# Patient Record
Sex: MALE | Race: WHITE | NOT HISPANIC OR LATINO | Employment: UNEMPLOYED | URBAN - METROPOLITAN AREA
[De-identification: names, ages, dates, MRNs, and addresses within clinical notes are randomized per-mention and may not be internally consistent; named-entity substitution may affect disease eponyms.]

---
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
## 2022-08-02 ENCOUNTER — OFFICE VISIT (OUTPATIENT)
Dept: FAMILY MEDICINE CLINIC | Facility: CLINIC | Age: 22
End: 2022-08-02
Payer: COMMERCIAL

## 2022-08-02 VITALS
WEIGHT: 173 LBS | RESPIRATION RATE: 12 BRPM | HEART RATE: 76 BPM | HEIGHT: 69 IN | BODY MASS INDEX: 25.62 KG/M2 | SYSTOLIC BLOOD PRESSURE: 124 MMHG | DIASTOLIC BLOOD PRESSURE: 72 MMHG | OXYGEN SATURATION: 97 % | TEMPERATURE: 97.6 F

## 2022-08-02 DIAGNOSIS — Z00.00 ENCOUNTER FOR ANNUAL GENERAL MEDICAL EXAMINATION WITHOUT ABNORMAL FINDINGS IN ADULT: Primary | ICD-10-CM

## 2022-08-02 PROCEDURE — 3725F SCREEN DEPRESSION PERFORMED: CPT | Performed by: NURSE PRACTITIONER

## 2022-08-02 PROCEDURE — 99395 PREV VISIT EST AGE 18-39: CPT | Performed by: NURSE PRACTITIONER

## 2022-08-02 NOTE — PROGRESS NOTES
FAMILY PRACTICE HEALTH MAINTENANCE OFFICE VISIT  [facility] - [address] PHYSICIANS    NAME: Malu Dolan  AGE: 25 y o  SEX: male  : 2000     DATE: 2022    Assessment and Plan     1  Encounter for annual general medical examination without abnormal findings in adult  Comments:  Age appropriate screenings and recommendations discussed  · Patient Counseling:   · Nutrition: Stressed importance of a well balanced diet, moderation of sodium/saturated fat, caloric balance and sufficient intake of fiber  · Exercise: Stressed the importance of regular exercise with a goal of 150 minutes per week  · Dental Health: Discussed daily flossing and brushing and regular dental visits   · Sexuality: Discussed sexually transmitted infections, use of condoms and prevention of unintended pregnancy  · Alcohol Use:  Recommended moderation of alcohol intake  · Injury Prevention: Discussed Safety Belts, Safety Helmets, and Smoke Detectors    · Immunizations reviewed: Up To Date  · Discussed benefits of:  Screening labs   BMI Counseling: Body mass index is 25 92 kg/m²  Discussed with patient's BMI with him  The BMI is above normal  Exercise recommendations include exercising 3-5 times per week      Return in about 1 year (around 2023) for Annual physical         Chief Complaint     Chief Complaint   Patient presents with    Annual Exam       History of Present Illness     HPI    Well Adult Physical   Patient here for a comprehensive physical exam       Diet and Physical Activity  Diet: well balanced diet  Exercise: daily      Depression Screen  PHQ-2/9 Depression Screening    Little interest or pleasure in doing things: 0 - not at all  Feeling down, depressed, or hopeless: 0 - not at all  PHQ-2 Score: 0  PHQ-2 Interpretation: Negative depression screen          General Health  Hearing: Normal:  bilateral  Vision: no vision problems  Dental: regular dental visits    Reproductive Health  No issues       The following portions of the patient's history were reviewed and updated as appropriate: allergies, current medications, past family history, past medical history, past social history, past surgical history and problem list     Review of Systems     Review of Systems   Constitutional: Negative for diaphoresis, fatigue and fever  HENT: Negative for ear pain and hearing loss  Eyes: Negative for pain and visual disturbance  Respiratory: Negative for chest tightness and shortness of breath  Cardiovascular: Negative for chest pain, palpitations and leg swelling  Gastrointestinal: Negative for abdominal pain, constipation and diarrhea  Genitourinary: Negative for difficulty urinating  Musculoskeletal: Negative for arthralgias and myalgias  Skin: Negative for rash  Neurological: Negative for dizziness, numbness and headaches  Psychiatric/Behavioral: Negative for sleep disturbance         Past Medical History     Past Medical History:   Diagnosis Date    Allergic rhinitis     seasonal    Fracture of femur, closed ([facility] [address] )        Past Surgical History     Past Surgical History:   Procedure Laterality Date    RHINOPLASTY         Social History     Social History     Socioeconomic History    Marital status: Single     Spouse name: None    Number of children: None    Years of education: None    Highest education level: None   Occupational History    None   Tobacco Use    Smoking status: Never Smoker    Smokeless tobacco: Never Used   Vaping Use    Vaping Use: Never used   Substance and Sexual Activity    Alcohol use: No    Drug use: Yes     Comment: occa    Sexual activity: None   Other Topics Concern    None   Social History Narrative    Regular dental care    No caffeine use     Social Determinants of Health     Financial Resource Strain: Not on file   Food Insecurity: Not on file   Transportation Needs: Not on file   Physical Activity: Not on file   Stress: Not on file   Social Connections: Not on file   Intimate Partner Violence: Not on file   Housing Stability: Not on file       Family History     Family History   Problem Relation Age of Onset    No Known Problems Mother     Kidney cancer Maternal Grandfather     No Known Problems Father     Substance Abuse Neg Hx     Mental illness Neg Hx        Current Medications       Current Outpatient Medications:     terbinafine (LamISIL) 250 mg tablet, Take 1 tablet (250 mg total) by mouth daily, Disp: 45 tablet, Rfl: 1     Allergies     No Known Allergies    Objective     /72 (BP Location: Right arm, Patient Position: Sitting, Cuff Size: Adult)   Pulse 76   Temp 97 6 °F (36 4 °C) (Temporal)   Resp 12   Ht 5' 8 5" (1 74 m)   Wt 78 5 kg (173 lb)   SpO2 97%   BMI 25 92 kg/m²      Physical Exam  Vitals reviewed  Constitutional:       General: He is not in acute distress  Appearance: Normal appearance  He is well-developed  He is not diaphoretic  HENT:      Head: Normocephalic and atraumatic  Right Ear: Tympanic membrane, ear canal and external ear normal       Left Ear: Tympanic membrane, ear canal and external ear normal    Eyes:      General: Lids are normal       Extraocular Movements: Extraocular movements intact  Conjunctiva/sclera: Conjunctivae normal       Pupils: Pupils are equal, round, and reactive to light  Pupils are equal       Funduscopic exam:     Right eye: No hemorrhage or exudate  Red reflex present  Left eye: No hemorrhage or exudate  Red reflex present  Neck:      Thyroid: No thyroid mass or thyromegaly  Vascular: No carotid bruit  Trachea: No tracheal deviation  Cardiovascular:      Rate and Rhythm: Normal rate and regular rhythm  Pulses: Normal pulses  Heart sounds: Normal heart sounds, S1 normal and S2 normal  No murmur heard  Pulmonary:      Effort: Pulmonary effort is normal       Breath sounds: Normal breath sounds   No decreased breath sounds, wheezing, rhonchi or rales  Chest:   Breasts:      Right: No supraclavicular adenopathy  Left: No supraclavicular adenopathy  Abdominal:      General: Bowel sounds are normal  There is no distension  Palpations: Abdomen is soft  There is no hepatomegaly or splenomegaly  Tenderness: There is no abdominal tenderness  Musculoskeletal:         General: No tenderness or deformity  Normal range of motion  Cervical back: Full passive range of motion without pain, normal range of motion and neck supple  Right lower leg: No edema  Left lower leg: No edema  Lymphadenopathy:      Cervical: No cervical adenopathy  Upper Body:      Right upper body: No supraclavicular adenopathy  Left upper body: No supraclavicular adenopathy  Skin:     General: Skin is warm and dry  Findings: No rash  Neurological:      General: No focal deficit present  Mental Status: He is alert and oriented to person, place, and time  Cranial Nerves: No cranial nerve deficit  Motor: Motor function is intact  Coordination: Coordination normal       Deep Tendon Reflexes: Reflexes are normal and symmetric  Psychiatric:         Speech: Speech normal          Behavior: Behavior normal          Thought Content:  Thought content normal          Judgment: Judgment normal              Ariel Garcia, [address] [facility],[address]

## 2022-12-12 ENCOUNTER — OFFICE VISIT (OUTPATIENT)
Dept: FAMILY MEDICINE CLINIC | Facility: CLINIC | Age: 22
End: 2022-12-12

## 2022-12-12 VITALS
SYSTOLIC BLOOD PRESSURE: 112 MMHG | TEMPERATURE: 98.2 F | WEIGHT: 166 LBS | OXYGEN SATURATION: 98 % | HEIGHT: 69 IN | RESPIRATION RATE: 12 BRPM | HEART RATE: 72 BPM | DIASTOLIC BLOOD PRESSURE: 68 MMHG | BODY MASS INDEX: 24.59 KG/M2

## 2022-12-12 DIAGNOSIS — H92.22 BLEEDING FROM LEFT EAR: Primary | ICD-10-CM

## 2022-12-12 NOTE — PROGRESS NOTES
Assessment/Plan:    1  Bleeding from left ear  Comments:  Advise clerical to schedule with ENT  Pt is scheduled to see Dr Reddy Thorne today at 1pm              Return if symptoms worsen or fail to improve  Subjective:      Patient ID: Vanessa Foster is a 25 y o  male  Chief Complaint   Patient presents with   • Ear Injury     Pt c/o left sided ear bleeding after cleaning and muffled hear since last night  Azra Jasso is a 25year old male who presents to the office for evaluation and management ear bleeding and muffled hearing  Reports that he used a qtip yesterday and then a few hours later was wearing headphones and noticed blood on the device after removing his headphones  Pt reports that his ear has been bleeding since that time  Denies pain but does report that his hearing is muffled on the left side  Denies preceding injury or exposure to loud noisses  The following portions of the patient's history were reviewed and updated as appropriate: allergies, current medications, past family history, past medical history, past social history, past surgical history and problem list     Review of Systems   HENT: Positive for ear discharge and hearing loss  Negative for ear pain  No current outpatient medications on file  No current facility-administered medications for this visit  Objective:    /68 (BP Location: Right arm, Patient Position: Sitting, Cuff Size: Large)   Pulse 72   Temp 98 2 °F (36 8 °C) (Temporal)   Resp 12   Ht 5' 8 5" (1 74 m)   Wt 75 3 kg (166 lb)   SpO2 98%   BMI 24 87 kg/m²        Physical Exam  Vitals reviewed  Constitutional:       Appearance: Normal appearance  HENT:      Head: Normocephalic and atraumatic  Right Ear: Tympanic membrane, ear canal and external ear normal       Left Ear: Drainage present  No tenderness        Ears:      Comments: Profuse bleeding; unable to visualize TM  Neurological:      Mental Status: He is alert and oriented to person, place, and time     Psychiatric:         Mood and Affect: Mood normal                 OMID Vance

## 2024-07-01 DIAGNOSIS — Z11.4 SCREENING FOR HIV (HUMAN IMMUNODEFICIENCY VIRUS): ICD-10-CM

## 2024-07-01 DIAGNOSIS — Z13.6 SCREENING FOR HYPERTENSION: ICD-10-CM

## 2024-07-01 DIAGNOSIS — Z13.29 SCREENING FOR THYROID DISORDER: ICD-10-CM

## 2024-07-01 DIAGNOSIS — Z13.220 SCREENING FOR LIPID DISORDERS: ICD-10-CM

## 2024-07-01 DIAGNOSIS — Z11.59 NEED FOR HEPATITIS C SCREENING TEST: ICD-10-CM

## 2024-07-01 DIAGNOSIS — Z00.00 ENCOUNTER FOR ANNUAL GENERAL MEDICAL EXAMINATION WITHOUT ABNORMAL FINDINGS IN ADULT: Primary | ICD-10-CM

## 2024-08-21 LAB
ALBUMIN SERPL-MCNC: 4.9 G/DL (ref 3.6–5.1)
ALBUMIN/GLOB SERPL: 1.8 (CALC) (ref 1–2.5)
ALP SERPL-CCNC: 45 U/L (ref 36–130)
ALT SERPL-CCNC: 16 U/L (ref 9–46)
AST SERPL-CCNC: 22 U/L (ref 10–40)
BASOPHILS # BLD AUTO: 21 CELLS/UL (ref 0–200)
BASOPHILS NFR BLD AUTO: 0.5 %
BILIRUB SERPL-MCNC: 0.8 MG/DL (ref 0.2–1.2)
BUN SERPL-MCNC: 22 MG/DL (ref 7–25)
BUN/CREAT SERPL: NORMAL (CALC) (ref 6–22)
CALCIUM SERPL-MCNC: 9.8 MG/DL (ref 8.6–10.3)
CHLORIDE SERPL-SCNC: 101 MMOL/L (ref 98–110)
CHOLEST SERPL-MCNC: 212 MG/DL
CHOLEST/HDLC SERPL: 3 (CALC)
CO2 SERPL-SCNC: 29 MMOL/L (ref 20–32)
CREAT SERPL-MCNC: 1.03 MG/DL (ref 0.6–1.24)
EOSINOPHIL # BLD AUTO: 41 CELLS/UL (ref 15–500)
EOSINOPHIL NFR BLD AUTO: 1 %
ERYTHROCYTE [DISTWIDTH] IN BLOOD BY AUTOMATED COUNT: 12.6 % (ref 11–15)
GFR/BSA.PRED SERPLBLD CYS-BASED-ARV: 105 ML/MIN/1.73M2
GLOBULIN SER CALC-MCNC: 2.8 G/DL (CALC) (ref 1.9–3.7)
GLUCOSE SERPL-MCNC: 81 MG/DL (ref 65–99)
HCT VFR BLD AUTO: 45.2 % (ref 38.5–50)
HCV AB SERPL QL IA: NORMAL
HDLC SERPL-MCNC: 71 MG/DL
HGB BLD-MCNC: 15.9 G/DL (ref 13.2–17.1)
HIV 1+2 AB+HIV1 P24 AG SERPL QL IA: NORMAL
LDLC SERPL CALC-MCNC: 122 MG/DL (CALC)
LYMPHOCYTES # BLD AUTO: 2136 CELLS/UL (ref 850–3900)
LYMPHOCYTES NFR BLD AUTO: 52.1 %
MCH RBC QN AUTO: 30.1 PG (ref 27–33)
MCHC RBC AUTO-ENTMCNC: 35.2 G/DL (ref 32–36)
MCV RBC AUTO: 85.6 FL (ref 80–100)
MONOCYTES # BLD AUTO: 295 CELLS/UL (ref 200–950)
MONOCYTES NFR BLD AUTO: 7.2 %
NEUTROPHILS # BLD AUTO: 1607 CELLS/UL (ref 1500–7800)
NEUTROPHILS NFR BLD AUTO: 39.2 %
NONHDLC SERPL-MCNC: 141 MG/DL (CALC)
PLATELET # BLD AUTO: 147 THOUSAND/UL (ref 140–400)
PMV BLD REES-ECKER: 11.4 FL (ref 7.5–12.5)
POTASSIUM SERPL-SCNC: 4.3 MMOL/L (ref 3.5–5.3)
PROT SERPL-MCNC: 7.7 G/DL (ref 6.1–8.1)
RBC # BLD AUTO: 5.28 MILLION/UL (ref 4.2–5.8)
SODIUM SERPL-SCNC: 139 MMOL/L (ref 135–146)
TRIGL SERPL-MCNC: 87 MG/DL
TSH SERPL-ACNC: 2.09 MIU/L (ref 0.4–4.5)
WBC # BLD AUTO: 4.1 THOUSAND/UL (ref 3.8–10.8)

## 2024-09-18 ENCOUNTER — OFFICE VISIT (OUTPATIENT)
Dept: FAMILY MEDICINE CLINIC | Facility: CLINIC | Age: 24
End: 2024-09-18
Payer: COMMERCIAL

## 2024-09-18 VITALS
RESPIRATION RATE: 16 BRPM | OXYGEN SATURATION: 99 % | DIASTOLIC BLOOD PRESSURE: 82 MMHG | BODY MASS INDEX: 24.59 KG/M2 | HEART RATE: 70 BPM | HEIGHT: 69 IN | WEIGHT: 166 LBS | SYSTOLIC BLOOD PRESSURE: 122 MMHG | TEMPERATURE: 98 F

## 2024-09-18 DIAGNOSIS — Z13.29 SCREENING FOR HYPOTHYROIDISM: ICD-10-CM

## 2024-09-18 DIAGNOSIS — Z13.6 SCREENING FOR HYPERTENSION: ICD-10-CM

## 2024-09-18 DIAGNOSIS — Z00.00 ROUTINE GENERAL MEDICAL EXAMINATION AT A HEALTH CARE FACILITY: Primary | ICD-10-CM

## 2024-09-18 DIAGNOSIS — Z13.220 SCREENING FOR HYPERLIPIDEMIA: ICD-10-CM

## 2024-09-18 PROBLEM — B35.1 TOENAIL FUNGUS: Status: RESOLVED | Noted: 2020-04-14 | Resolved: 2024-09-18

## 2024-09-18 PROBLEM — B35.9 TINEA: Status: RESOLVED | Noted: 2020-03-08 | Resolved: 2024-09-18

## 2024-09-18 LAB
SL AMB  POCT GLUCOSE, UA: NORMAL
SL AMB LEUKOCYTE ESTERASE,UA: NEGATIVE
SL AMB POCT BILIRUBIN,UA: NEGATIVE
SL AMB POCT BLOOD,UA: NEGATIVE
SL AMB POCT CLARITY,UA: CLEAR
SL AMB POCT COLOR,UA: YELLOW
SL AMB POCT KETONES,UA: NEGATIVE
SL AMB POCT NITRITE,UA: NEGATIVE
SL AMB POCT PH,UA: 7
SL AMB POCT SPECIFIC GRAVITY,UA: 1
SL AMB POCT URINE PROTEIN: NEGATIVE
SL AMB POCT UROBILINOGEN: NORMAL

## 2024-09-18 PROCEDURE — 81003 URINALYSIS AUTO W/O SCOPE: CPT | Performed by: FAMILY MEDICINE

## 2024-09-18 PROCEDURE — 99395 PREV VISIT EST AGE 18-39: CPT | Performed by: FAMILY MEDICINE

## 2024-09-18 NOTE — LETTER
JESSICA GRIFFIN    No current outpatient medications on file.      Recent Results (from the past 2688 hour(s))   CBC and differential    Collection Time: 08/21/24  7:16 AM   Result Value Ref Range    White Blood Cell Count 4.1 3.8 - 10.8 Thousand/uL    Red Blood Cell Count 5.28 4.20 - 5.80 Million/uL    Hemoglobin 15.9 13.2 - 17.1 g/dL    HCT 45.2 38.5 - 50.0 %    MCV 85.6 80.0 - 100.0 fL    MCH 30.1 27.0 - 33.0 pg    MCHC 35.2 32.0 - 36.0 g/dL    RDW 12.6 11.0 - 15.0 %    Platelet Count 147 140 - 400 Thousand/uL    SL AMB MPV 11.4 7.5 - 12.5 fL    Neutrophils (Absolute) 1,607 1,500 - 7,800 cells/uL    Lymphocytes (Absolute) 2,136 850 - 3,900 cells/uL    Monocytes (Absolute) 295 200 - 950 cells/uL    Eosinophils (Absolute) 41 15 - 500 cells/uL    Basophils ABS 21 0 - 200 cells/uL    Neutrophils 39.2 %    Lymphocytes 52.1 %    Monocytes 7.2 %    Eosinophils 1.0 %    Basophils PCT 0.5 %   Comprehensive metabolic panel    Collection Time: 08/21/24  7:16 AM   Result Value Ref Range    Glucose, Random 81 65 - 99 mg/dL    BUN 22 7 - 25 mg/dL    Creatinine 1.03 0.60 - 1.24 mg/dL    eGFR 105 > OR = 60 mL/min/1.73m2    SL AMB BUN/CREATININE RATIO SEE NOTE: 6 - 22 (calc)    Sodium 139 135 - 146 mmol/L    Potassium 4.3 3.5 - 5.3 mmol/L    Chloride 101 98 - 110 mmol/L    CO2 29 20 - 32 mmol/L    Calcium 9.8 8.6 - 10.3 mg/dL    Protein, Total 7.7 6.1 - 8.1 g/dL    Albumin 4.9 3.6 - 5.1 g/dL    Globulin 2.8 1.9 - 3.7 g/dL (calc)    Albumin/Globulin Ratio 1.8 1.0 - 2.5 (calc)    TOTAL BILIRUBIN 0.8 0.2 - 1.2 mg/dL    Alkaline Phosphatase 45 36 - 130 U/L    AST 22 10 - 40 U/L    ALT 16 9 - 46 U/L   Hepatitis C Ab W/Refl To HCV RNA, Qn, PCR    Collection Time: 08/21/24  7:16 AM   Result Value Ref Range    HEP C AB NON-REACTIVE NON-REACTIVE   Lipid panel    Collection Time: 08/21/24  7:16 AM   Result Value Ref Range    Total Cholesterol 212 (H) <200 mg/dL    HDL 71 > OR = 40 mg/dL    Triglycerides 87 <150 mg/dL    LDL Calculated 122  (H) mg/dL (calc)    Chol HDLC Ratio 3.0 <5.0 (calc)    Non-HDL Cholesterol 141 (H) <130 mg/dL (calc)   TSH, 3rd generation    Collection Time: 08/21/24  7:16 AM   Result Value Ref Range    TSH 2.09 0.40 - 4.50 mIU/L   HIV 1/2 AG/AB W REFLEX [facility] and [facility] only    Collection Time: 08/21/24  7:16 AM   Result Value Ref Range    HIV AG/AB, 4th Gen NON-REACTIVE NON-REACTIVE

## 2024-09-18 NOTE — PROGRESS NOTES
FAMILY PRACTICE HEALTH MAINTENANCE OFFICE VISIT  [facility] - [facility]ANS    NAME: Trenton Dow  AGE: 24 y.o. SEX: male  : 2000     DATE: 2024    Assessment and Plan     Problem List Items Addressed This Visit    None  Visit Diagnoses       Routine general medical examination at a health care facility    -  Primary    Screening for hypertension        Screening for hyperlipidemia        Screening for hypothyroidism                     Return in about 1 year (around 2025) for Annual physical.        Chief Complaint     Chief Complaint   Patient presents with   • Physical Exam       History of Present Illness     DISCUSSED HEALTH ISSUES  REVIEWED MEDICAL RECORD  NO CONCERNS AT THIS TIME          Well Adult Physical   Patient here for a comprehensive physical exam.      Diet and Physical Activity  Diet: well balanced diet  Weight concerns: Patient is overweight (BMI 25.0-29.9)  Exercise: intermittently      Depression Screen  PHQ-2/9 Depression Screening    Little interest or pleasure in doing things: 0 - not at all  Feeling down, depressed, or hopeless: 0 - not at all  PHQ-2 Score: 0  PHQ-2 Interpretation: Negative depression screen          General Health  Hearing: Normal:  bilateral  Vision: no vision problems  Dental: regular dental visits    Reproductive Health          The following portions of the patient's history were reviewed and updated as appropriate: allergies, current medications, past family history, past medical history, past social history, past surgical history and problem list.    Review of Systems     Review of Systems   Constitutional:  Negative for chills, fatigue and fever.   HENT:  Negative for congestion, ear discharge, ear pain, mouth sores, postnasal drip, sore throat and trouble swallowing.    Eyes:  Negative for pain, discharge and visual disturbance.   Respiratory:  Negative for cough, shortness of breath and wheezing.     Cardiovascular:  Negative for chest pain, palpitations and leg swelling.   Gastrointestinal:  Negative for abdominal distention, abdominal pain, blood in stool, diarrhea and nausea.   Endocrine: Negative for polydipsia, polyphagia and polyuria.   Genitourinary:  Negative for dysuria, frequency, hematuria and urgency.   Musculoskeletal:  Negative for arthralgias, gait problem and joint swelling.   Skin:  Negative for pallor and rash.   Neurological:  Negative for dizziness, syncope, speech difficulty, weakness, light-headedness, numbness and headaches.   Hematological:  Negative for adenopathy.   Psychiatric/Behavioral:  Negative for behavioral problems, confusion and sleep disturbance. The patient is not nervous/anxious.        Past Medical History     Past Medical History:   Diagnosis Date   • Allergic rhinitis     seasonal   • Fracture of femur, closed (HCC)        Past Surgical History     Past Surgical History:   Procedure Laterality Date   • RHINOPLASTY         Social History     Social History     Socioeconomic History   • Marital status: Single     Spouse name: None   • Number of children: None   • Years of education: None   • Highest education level: None   Occupational History   • None   Tobacco Use   • Smoking status: Never   • Smokeless tobacco: Never   Vaping Use   • Vaping status: Never Used   Substance and Sexual Activity   • Alcohol use: No   • Drug use: Yes     Comment: occa   • Sexual activity: None   Other Topics Concern   • None   Social History Narrative    Regular dental care    No caffeine use     Social Determinants of Health     Financial Resource Strain: Not on file   Food Insecurity: Not on file   Transportation Needs: Not on file   Physical Activity: Not on file   Stress: Not on file   Social Connections: Not on file   Intimate Partner Violence: Not on file   Housing Stability: Not on file       Family History     Family History   Problem Relation Age of Onset   • No Known Problems  Mother    • Kidney cancer Maternal Grandfather    • No Known Problems Father    • Substance Abuse Neg Hx    • Mental illness Neg Hx        Current Medications     No current outpatient medications on file.     Allergies     No Known Allergies    Objective     There were no vitals taken for this visit.     Physical Exam  Constitutional:       General: He is not in acute distress.     Appearance: Normal appearance. He is well-developed and normal weight. He is not ill-appearing.   HENT:      Head: Normocephalic and atraumatic.      Right Ear: Tympanic membrane and external ear normal.      Left Ear: Tympanic membrane and external ear normal.      Nose: Nose normal.      Mouth/Throat:      Mouth: Mucous membranes are moist.   Eyes:      General:         Right eye: No discharge.         Left eye: No discharge.      Conjunctiva/sclera: Conjunctivae normal.      Pupils: Pupils are equal, round, and reactive to light.   Neck:      Thyroid: No thyromegaly.      Vascular: No JVD.   Cardiovascular:      Rate and Rhythm: Normal rate and regular rhythm.      Heart sounds: Normal heart sounds. No murmur heard.  Pulmonary:      Effort: Pulmonary effort is normal.      Breath sounds: Normal breath sounds. No wheezing or rales.   Abdominal:      General: Bowel sounds are normal.      Palpations: Abdomen is soft. There is no mass.      Tenderness: There is no abdominal tenderness. There is no guarding or rebound.   Genitourinary:     Penis: Normal.       Testes: Normal.   Musculoskeletal:         General: No tenderness or deformity. Normal range of motion.      Cervical back: Neck supple.   Lymphadenopathy:      Cervical: No cervical adenopathy.   Skin:     General: Skin is warm and dry.      Findings: No erythema or rash.   Neurological:      General: No focal deficit present.      Mental Status: He is alert and oriented to person, place, and time.      Cranial Nerves: No cranial nerve deficit.      Sensory: No sensory deficit.       Motor: No weakness or abnormal muscle tone.      Coordination: Coordination normal.      Gait: Gait normal.      Deep Tendon Reflexes: Reflexes are normal and symmetric. Reflexes normal.   Psychiatric:         Mood and Affect: Mood normal.         Behavior: Behavior normal.         Thought Content: Thought content normal.         Judgment: Judgment normal.         No results found.    Health Maintenance     Health Maintenance   Topic Date Due   • Annual Physical  08/02/2023   • COVID-19 Vaccine (1 - 2023-24 season) Never done   • Influenza Vaccine (1) 09/01/2024   • Depression Screening  09/18/2025   • DTaP,Tdap,and Td Vaccines (4 - Td or Tdap) 05/29/2029   • Zoster Vaccine (1 of 2) 05/03/2051   • RSV Vaccine Age 60+ Years (1 - 1-dose 60+ series) 05/03/2061   • HIV Screening  Completed   • Hepatitis C Screening  Completed   • Pneumococcal Vaccine: Pediatrics (0 to 5 Years) and At-Risk Patients (6 to 64 Years)  Completed   • HIB Vaccine  Completed   • IPV Vaccine  Completed   • Meningococcal ACWY Vaccine  Completed   • HPV Vaccine  Completed   • RSV Vaccine age 0-20 Months  Aged Out   • Hepatitis A Vaccine  Aged Out     Immunization History   Administered Date(s) Administered   • DTP 05/15/2006   • HPV9 12/26/2018, 02/20/2019, 05/26/2020   • Hep B, Adolescent or Pediatric 02/07/2002, 05/01/2003   • Hepatitis B 06/26/2017, 07/27/2017, 08/02/2018   • HiB 2000, 2001, 2001, 08/19/2002   • INFLUENZA 10/09/2019, 10/09/2019, 11/18/2021   • IPV 2000, 2001, 11/19/2002, 05/15/2006   • Influenza Injectable, MDCK, Preservative Free, Quadrivalent, 0.5 mL 10/24/2019, 09/11/2020   • Influenza, injectable, quadrivalent, preservative free 0.5 mL 12/26/2018   • Influenza, seasonal, injectable 11/15/2010   • MMR 08/19/2002, 05/15/2006   • Meningococcal B, OMV (BEXSERO) 05/29/2019, 07/25/2019   • Meningococcal MCV4P 05/29/2019   • Meningococcal, Unknown Serogroups 03/19/2012   • Pneumococcal Conjugate  13-Valent 2001, 2001, 2001, 05/07/2002   • Tdap 2001, 2001, 2001, 11/19/2002, 03/19/2012, 05/29/2019   • Varicella 05/07/2002, 06/12/2019       Abel Christine MD  [facility]

## 2024-09-18 NOTE — PATIENT INSTRUCTIONS
DISCUSSED HEALTH MAINTENENCE ISSUES  BW WILL BE REVIEWED  ENCOURAGED HEALTHY DIET AND EXERCISE    RV FOR ANNUAL HEALTH EXAM IN 1 YEAR  RV SOONER IF THERE ARE ANY CONCERNS      Recent Results (from the past 2688 hour(s))   CBC and differential    Collection Time: 08/21/24  7:16 AM   Result Value Ref Range    White Blood Cell Count 4.1 3.8 - 10.8 Thousand/uL    Red Blood Cell Count 5.28 4.20 - 5.80 Million/uL    Hemoglobin 15.9 13.2 - 17.1 g/dL    HCT 45.2 38.5 - 50.0 %    MCV 85.6 80.0 - 100.0 fL    MCH 30.1 27.0 - 33.0 pg    MCHC 35.2 32.0 - 36.0 g/dL    RDW 12.6 11.0 - 15.0 %    Platelet Count 147 140 - 400 Thousand/uL    SL AMB MPV 11.4 7.5 - 12.5 fL    Neutrophils (Absolute) 1,607 1,500 - 7,800 cells/uL    Lymphocytes (Absolute) 2,136 850 - 3,900 cells/uL    Monocytes (Absolute) 295 200 - 950 cells/uL    Eosinophils (Absolute) 41 15 - 500 cells/uL    Basophils ABS 21 0 - 200 cells/uL    Neutrophils 39.2 %    Lymphocytes 52.1 %    Monocytes 7.2 %    Eosinophils 1.0 %    Basophils PCT 0.5 %   Comprehensive metabolic panel    Collection Time: 08/21/24  7:16 AM   Result Value Ref Range    Glucose, Random 81 65 - 99 mg/dL    BUN 22 7 - 25 mg/dL    Creatinine 1.03 0.60 - 1.24 mg/dL    eGFR 105 > OR = 60 mL/min/1.73m2    SL AMB BUN/CREATININE RATIO SEE NOTE: 6 - 22 (calc)    Sodium 139 135 - 146 mmol/L    Potassium 4.3 3.5 - 5.3 mmol/L    Chloride 101 98 - 110 mmol/L    CO2 29 20 - 32 mmol/L    Calcium 9.8 8.6 - 10.3 mg/dL    Protein, Total 7.7 6.1 - 8.1 g/dL    Albumin 4.9 3.6 - 5.1 g/dL    Globulin 2.8 1.9 - 3.7 g/dL (calc)    Albumin/Globulin Ratio 1.8 1.0 - 2.5 (calc)    TOTAL BILIRUBIN 0.8 0.2 - 1.2 mg/dL    Alkaline Phosphatase 45 36 - 130 U/L    AST 22 10 - 40 U/L    ALT 16 9 - 46 U/L   Hepatitis C Ab W/Refl To HCV RNA, Qn, PCR    Collection Time: 08/21/24  7:16 AM   Result Value Ref Range    HEP C AB NON-REACTIVE NON-REACTIVE   Lipid panel    Collection Time: 08/21/24  7:16 AM   Result Value Ref Range    Total  Cholesterol 212 (H) <200 mg/dL    HDL 71 > OR = 40 mg/dL    Triglycerides 87 <150 mg/dL    LDL Calculated 122 (H) mg/dL (calc)    Chol HDLC Ratio 3.0 <5.0 (calc)    Non-HDL Cholesterol 141 (H) <130 mg/dL (calc)   TSH, 3rd generation    Collection Time: 08/21/24  7:16 AM   Result Value Ref Range    TSH 2.09 0.40 - 4.50 mIU/L   HIV 1/2 AG/AB W REFLEX [facility] and [facility] only    Collection Time: 08/21/24  7:16 AM   Result Value Ref Range    HIV AG/AB, 4th Gen NON-REACTIVE NON-REACTIVE

## 2025-05-20 ENCOUNTER — TELEPHONE (OUTPATIENT)
Age: 25
End: 2025-05-20

## 2025-05-20 ENCOUNTER — OFFICE VISIT (OUTPATIENT)
Dept: URGENT CARE | Facility: CLINIC | Age: 25
End: 2025-05-20
Payer: COMMERCIAL

## 2025-05-20 VITALS
HEART RATE: 65 BPM | WEIGHT: 164.6 LBS | HEIGHT: 68 IN | DIASTOLIC BLOOD PRESSURE: 68 MMHG | TEMPERATURE: 97.8 F | OXYGEN SATURATION: 99 % | SYSTOLIC BLOOD PRESSURE: 114 MMHG | RESPIRATION RATE: 18 BRPM | BODY MASS INDEX: 24.95 KG/M2

## 2025-05-20 DIAGNOSIS — B96.89 ACUTE BACTERIAL SINUSITIS: Primary | ICD-10-CM

## 2025-05-20 DIAGNOSIS — J01.90 ACUTE BACTERIAL SINUSITIS: Primary | ICD-10-CM

## 2025-05-20 PROCEDURE — 99213 OFFICE O/P EST LOW 20 MIN: CPT | Performed by: FAMILY MEDICINE

## 2025-05-20 RX ORDER — FLUTICASONE PROPIONATE 50 MCG
1 SPRAY, SUSPENSION (ML) NASAL DAILY
Qty: 16 G | Refills: 0 | Status: SHIPPED | OUTPATIENT
Start: 2025-05-20

## 2025-05-20 NOTE — PROGRESS NOTES
[facility]        NAME: Trenton Dow is a 25 y.o. male  : 2000    MRN: [id number]  DATE: May 20, 2025  TIME: 12:44 PM    Assessment and Plan   Acute bacterial sinusitis [J01.90, B96.89]  1. Acute bacterial sinusitis  amoxicillin-clavulanate (AUGMENTIN) 875-125 mg per tablet    fluticasone (FLONASE) 50 mcg/act nasal spray        Patient Instructions     Patient Instructions   - Augmentin x 7 days prescribed, complete as directed, side effects discussed, take antibiotic with food/water and yogurt and probiotics to help prevent GI upset.   - Flonase nasal spray prescribed, use as directed to help with nasal/sinus symptoms   - take Tylenol or Motrin as needed for pain/fever   - drink plenty of fluids and run a humidifier at home   - try warm salt water gargles and throat lozenges as needed   - drink warm tea w/ lemon and honey   - may take Mucinex DM as needed for cough/chest congestion   - if symptoms persist despite treatment or worsen, follow up w/ PCP office for re-check     Follow up with PCP in 3-5 days.  Proceed to  ER if symptoms worsen.    If tests have been performed at [facility], our office will contact you with results if changes need to be made to the care plan discussed with you at the visit.  You can [facility] full [facility] on [facility].    Chief Complaint     Chief Complaint   Patient presents with    Cold Like Symptoms     Pt ill x 1 week, chest congestion, cough, now sinus pressure and nasal congestion, no fever. Pt took Mucinex and Dayquil.     History of Present Illness     24 yo male presents c/o headache, sinus pressure, nasal congestion, rhinorrhea, post-nasal drip, sore throat, chest congestion, and productive cough. He has been ill for approx 1 week. No fever/chills or body aches. No chest pain, SOB, or wheezing. Patient is not a smoker. No GI sx. No recent travel or known sick contacts. Patient has no known allergies. She has taken Dayquil and Mucinex for the  "symptoms.      Review of Systems   Review of Systems   Constitutional: Negative.    HENT:  Positive for congestion, ear pain, postnasal drip, rhinorrhea, sinus pressure, sinus pain and sore throat.    Eyes: Negative.    Respiratory:  Positive for cough.    Cardiovascular: Negative.    Gastrointestinal: Negative.    Musculoskeletal: Negative.    Skin: Negative.    Allergic/Immunologic: Negative.    Neurological:  Positive for headaches.   Hematological: Negative.      Current Medications     Current Medications[1]    Current Allergies     Allergies as of 05/20/2025    (No Known Allergies)            The following portions of the patient's history were reviewed and updated as appropriate: allergies, current medications, past family history, past medical history, past social history, past surgical history and problem list.     Past Medical History:   Diagnosis Date    Allergic rhinitis     seasonal    Fracture of femur, closed (HCC)        Past Surgical History:   Procedure Laterality Date    RHINOPLASTY         Family History   Problem Relation Age of Onset    No Known Problems Mother     Kidney cancer Maternal Grandfather     No Known Problems Father     Substance Abuse Neg Hx     Mental illness Neg Hx          Medications have been verified.        Objective   /68 (Patient Position: Sitting, Cuff Size: Standard)   Pulse 65   Temp 97.8 °F (36.6 °C) (Tympanic)   Resp 18   Ht 5' 8\" (1.727 m)   Wt 74.7 kg (164 lb 9.6 oz)   SpO2 99%   BMI 25.03 kg/m²   No LMP for male patient.       Physical Exam     Physical Exam  Vitals and nursing note reviewed.   Constitutional:       General: He is awake. He is not in acute distress.     Appearance: Normal appearance. He is well-developed and well-groomed. He is not ill-appearing, toxic-appearing or diaphoretic.   HENT:      Head: Normocephalic and atraumatic.      Jaw: There is normal jaw occlusion.      Right Ear: Tympanic membrane, ear canal and external ear normal. "      Left Ear: Tympanic membrane, ear canal and external ear normal.      Nose: Mucosal edema and congestion present.      Right Sinus: Maxillary sinus tenderness and frontal sinus tenderness present.      Left Sinus: Maxillary sinus tenderness and frontal sinus tenderness present.      Mouth/Throat:      Lips: Pink. No lesions.      Mouth: Mucous membranes are moist.      Pharynx: Uvula midline. Posterior oropharyngeal erythema and postnasal drip present. No pharyngeal swelling, oropharyngeal exudate or uvula swelling.      Tonsils: No tonsillar exudate or tonsillar abscesses.     Eyes:      General: Lids are normal.      Conjunctiva/sclera: Conjunctivae normal.     Neck:      Trachea: Trachea and phonation normal.     Cardiovascular:      Rate and Rhythm: Normal rate and regular rhythm.      Pulses: Normal pulses.      Heart sounds: Normal heart sounds.   Pulmonary:      Effort: Pulmonary effort is normal. No tachypnea, accessory muscle usage or respiratory distress.      Breath sounds: Normal breath sounds and air entry.     Musculoskeletal:      Cervical back: Neck supple. No edema, erythema, rigidity or tenderness.   Lymphadenopathy:      Cervical: No cervical adenopathy.     Skin:     General: Skin is warm and dry.      Capillary Refill: Capillary refill takes less than 2 seconds.      Coloration: Skin is not pale.     Neurological:      Mental Status: He is alert and oriented to person, place, and time. Mental status is at baseline.     Psychiatric:         Mood and Affect: Mood normal.         Behavior: Behavior normal. Behavior is cooperative.         Thought Content: Thought content normal.         Judgment: Judgment normal.                        [1]   Current Outpatient Medications:     amoxicillin-clavulanate (AUGMENTIN) 875-125 mg per tablet, Take 1 tablet by mouth every 12 (twelve) hours for 7 days, Disp: 14 tablet, Rfl: 0    fluticasone (FLONASE) 50 mcg/act nasal spray, 1 spray into each nostril  daily, Disp: 16 g, Rfl: 0

## 2025-05-20 NOTE — TELEPHONE ENCOUNTER
Patient called for a same day appt for congestion and cough. No appts available. Pt recommend to go to [facility] pt understood and aggred to do so.

## 2025-05-20 NOTE — PATIENT INSTRUCTIONS
- Augmentin x 7 days prescribed, complete as directed, side effects discussed, take antibiotic with food/water and yogurt and probiotics to help prevent GI upset.   - Flonase nasal spray prescribed, use as directed to help with nasal/sinus symptoms   - take Tylenol or Motrin as needed for pain/fever   - drink plenty of fluids and run a humidifier at home   - try warm salt water gargles and throat lozenges as needed   - drink warm tea w/ lemon and honey   - may take Mucinex DM as needed for cough/chest congestion   - if symptoms persist despite treatment or worsen, follow up w/ PCP office for re-check